# Patient Record
Sex: FEMALE | Race: WHITE | NOT HISPANIC OR LATINO
[De-identification: names, ages, dates, MRNs, and addresses within clinical notes are randomized per-mention and may not be internally consistent; named-entity substitution may affect disease eponyms.]

---

## 2018-07-31 ENCOUNTER — RESULT REVIEW (OUTPATIENT)
Age: 63
End: 2018-07-31

## 2019-11-20 ENCOUNTER — RESULT REVIEW (OUTPATIENT)
Age: 64
End: 2019-11-20

## 2020-08-12 ENCOUNTER — RESULT REVIEW (OUTPATIENT)
Age: 65
End: 2020-08-12

## 2021-11-17 ENCOUNTER — RESULT REVIEW (OUTPATIENT)
Age: 66
End: 2021-11-17

## 2021-11-18 ENCOUNTER — NON-APPOINTMENT (OUTPATIENT)
Age: 66
End: 2021-11-18

## 2021-11-19 PROBLEM — Z00.00 ENCOUNTER FOR PREVENTIVE HEALTH EXAMINATION: Status: ACTIVE | Noted: 2021-11-19

## 2021-12-01 ENCOUNTER — NON-APPOINTMENT (OUTPATIENT)
Age: 66
End: 2021-12-01

## 2021-12-01 ENCOUNTER — APPOINTMENT (OUTPATIENT)
Dept: BREAST CENTER | Facility: CLINIC | Age: 66
End: 2021-12-01
Payer: COMMERCIAL

## 2021-12-01 VITALS
BODY MASS INDEX: 25.49 KG/M2 | HEIGHT: 65 IN | OXYGEN SATURATION: 98 % | WEIGHT: 153 LBS | SYSTOLIC BLOOD PRESSURE: 141 MMHG | DIASTOLIC BLOOD PRESSURE: 78 MMHG | HEART RATE: 62 BPM

## 2021-12-01 DIAGNOSIS — Z87.898 PERSONAL HISTORY OF OTHER SPECIFIED CONDITIONS: ICD-10-CM

## 2021-12-01 DIAGNOSIS — N64.4 MASTODYNIA: ICD-10-CM

## 2021-12-01 DIAGNOSIS — Z86.79 PERSONAL HISTORY OF OTHER DISEASES OF THE CIRCULATORY SYSTEM: ICD-10-CM

## 2021-12-01 DIAGNOSIS — J98.4 OTHER DISORDERS OF LUNG: ICD-10-CM

## 2021-12-01 PROCEDURE — 99203 OFFICE O/P NEW LOW 30 MIN: CPT

## 2021-12-01 NOTE — HISTORY OF PRESENT ILLNESS
[FreeTextEntry1] : 66-year-old postmenopausal woman of Ashkenazi Roman Catholic descent with a family history of breast cancer at a screening mammogram which showed a 4 mm density in the left breast this was found on ultrasound to be a complex cyst and in addition adjacent to it at the 3 o'clock position was not another small mass possibly a cyst, BI-RADS 3.  Patient denies any breast masses or nipple discharge but she does have some right axillary pain occasionally.  Patient's paternal grandmother had breast cancer at age 98.  Patient has never had a breast biopsy and is never taken hormone replacement therapy except for estradiol cream which she since stopped.

## 2022-06-15 ENCOUNTER — APPOINTMENT (OUTPATIENT)
Dept: BREAST CENTER | Facility: CLINIC | Age: 67
End: 2022-06-15
Payer: COMMERCIAL

## 2022-06-15 DIAGNOSIS — R92.2 INCONCLUSIVE MAMMOGRAM: ICD-10-CM

## 2022-06-15 DIAGNOSIS — N60.12 DIFFUSE CYSTIC MASTOPATHY OF LEFT BREAST: ICD-10-CM

## 2022-06-15 PROCEDURE — 99212 OFFICE O/P EST SF 10 MIN: CPT

## 2022-06-15 NOTE — HISTORY OF PRESENT ILLNESS
[FreeTextEntry1] : Maternal grandmother with breast cancer at 98\par Fibrocystic changes\par \par Patient denies any breast masses and nipple discharge.  Recent left ultrasound showed benign stability (report pending)\par \par 66-year-old postmenopausal woman of Ashkenazi Oriental orthodox descent with a family history of breast cancer at a screening mammogram which showed a 4 mm density in the left breast this was found on ultrasound to be a complex cyst and in addition adjacent to it at the 3 o'clock position was not another small mass possibly a cyst, BI-RADS 3.  Patient denies any breast masses or nipple discharge but she does have some right axillary pain occasionally.  Patient's paternal grandmother had breast cancer at age 98.  Patient has never had a breast biopsy and is never taken hormone replacement therapy except for estradiol cream which she since stopped.

## 2023-07-27 ENCOUNTER — APPOINTMENT (OUTPATIENT)
Dept: BREAST CENTER | Facility: CLINIC | Age: 68
End: 2023-07-27
Payer: COMMERCIAL

## 2023-07-27 VITALS
BODY MASS INDEX: 23.99 KG/M2 | OXYGEN SATURATION: 97 % | WEIGHT: 144 LBS | HEART RATE: 54 BPM | DIASTOLIC BLOOD PRESSURE: 66 MMHG | HEIGHT: 65 IN | SYSTOLIC BLOOD PRESSURE: 101 MMHG

## 2023-07-27 DIAGNOSIS — Z12.31 ENCOUNTER FOR SCREENING MAMMOGRAM FOR MALIGNANT NEOPLASM OF BREAST: ICD-10-CM

## 2023-07-27 PROCEDURE — 99213 OFFICE O/P EST LOW 20 MIN: CPT

## 2023-07-27 NOTE — HISTORY OF PRESENT ILLNESS
[FreeTextEntry1] : Maternal grandmother with breast cancer at 98\par Fibrocystic changes\par \par Patient denies any breast masses and nipple discharge.  Last January she had a mammogram and ultrasound that was unremarkable with some focal left fat necrosis, benign.\par 66-year-old postmenopausal woman of Ashkenazi Rest[employer] descent with a family history of breast cancer at a screening mammogram which showed a 4 mm density in the left breast this was found on ultrasound to be a complex cyst and in addition adjacent to it at the 3 o'clock position was not another small mass possibly a cyst, BI-RADS 3.  Patient denies any breast masses or nipple discharge but she does have some right axillary pain occasionally.  Patient's paternal grandmother had breast cancer at age 98.  Patient has never had a breast biopsy and is never taken hormone replacement therapy except for estradiol cream which she since stopped.

## 2023-09-06 ENCOUNTER — APPOINTMENT (OUTPATIENT)
Dept: HEMATOLOGY ONCOLOGY | Facility: CLINIC | Age: 68
End: 2023-09-06

## 2023-09-07 NOTE — DISCUSSION/SUMMARY
[FreeTextEntry1] :   REASON FOR CONSULT  Linda Li is a 68-year-old female who was referred by Dr. Tapia for cancer genetic counseling and cascade genetic testing due to her family history of cancer and known familial SHERI gene mutation.    RELEVANT MEDICAL HISTORY    FAMILY HISTORY:  Ms. Li is a healthy individual who previously had a squamous cell cancer removed from her leg at age 60. Ms. Li also reports a family history of pancreatic, prostate, breast, stomach and other cancers, see below.  Of note, Ms. Shook's paternal first cousin was recently diagnosed with pancreatic cancer and pursued genetic testing with [facility] hereditary cancer panel reported on 3/30/2023. 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 A copy of his genetic report was available at the consultation.    OTHER MEDICAL AND SURGICAL HISTORY:  Asthma. Alpha-1 antitrypsin deficiency. Fibrocystic changes to left breast. History of spinal surgery. Hip replacement.    PAST OB/GYN HISTORY:  Obstetrical History: GP2  Age at Menarche: 13 Menopausal with LMP at age 42  Age at First Live Birth: 31  Oral Contraceptive Use: Yes, (for 3 years)  Hormone Replacement Therapy: Yes, (currently on Estradiol cream for past 5 years)    CANCER SCREENING HISTORY:    Breast: Mammogram and sonograms every 6 months to monitor left breast cysts which have been present for the past 3 years. Bilateral mammogram in 1/18/2023 in [place], reported "left breast finding consistent with benign fat necrosis, no evidence of malignancy". Annual follow up recommended. Clinical breast exam last month with Dr. Tapia. GYN: Last exam within past couple of years. Colon:  Baseline colonoscopy at age 60 reported normal. Last colonoscopy August 30, 2023, single tubular adenoma removed from sigmoid colon.  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 FBSE with Dermatologist every 4 months. Living in [place] for past 5 years.    SOCIAL HISTORY:  -	 -	Tobacco-product use: No  FAMILY HISTORY:  Maternal ancestry was reported as [place] and paternal ancestry was reported as Polish with Ashkenazi Methodist ancestry on both sides. A detailed family history of cancer was ascertained. Relevant diagnoses are detailed below and in the scanned pedigree.        RISK ASSESSMENT:  Ms. Li is at risk of inheriting the familial SHERI gene mutation identified in her paternal first cousin. Individuals with a single pathogenic SHERI mutation have increased risks for breast, ovarian, pancreatic and possibly other cancers. Ms. Li was very keen to undergo genetic testing to help determine any relevant cancer screening or risk reduction options. Given the patient's Ashkenazi Methodist heritage and family history of breast, prostate and pancreatic cancers, Ms. Li is a good candidate for comprehensive genetic testing of additional cancer susceptibility genes. We recommended genetic testing with breast, prostate, and pancreatic cancer panels, which include analysis of the SHERI gene. This test analyzes 26 genes: APC, SHERI, BARD1, BMPR1A, BRCA1, BRCA2, CDH1, CDKN2A, CHEK2, EPCAM, HOXB13, MEN1, MLH1, MSH2, MSH6, NBN, NF1, PALB2, PMS2, PTEN, SMAD4, STK11, TP53, TSC1, TSC2, VHL.   We discussed the risks, benefits and limitations, and implications of genetic testing. We also discussed the psychosocial implications of genetic testing. Possible test results were reviewed with Ms. Li, along with associated medical management options. The Genetic Information Non-discrimination Act ([place]) was also reviewed.     Ms. Li consented to the above-mentioned genetic testing panels. Blood was drawn in our laboratory and sent to [facility] today.      PLAN:    1.	Blood drawn today will be sent to [facility] for analysis.   2.	We will contact Ms. Li once the results are available and will schedule a follow-up appointment, as needed. Results generally return in 2-3 weeks from the day the sample is received in the lab.  3.	Family member's report(s) will be scanned to [facility] secure file cabinet.      For any additional questions please call Cancer Genetics at [phone].       Sadia Jerome, MSc, [facility]  Genetic Counselor, Cancer Genetics      CC:   Dr. Tapia

## 2023-09-19 ENCOUNTER — NON-APPOINTMENT (OUTPATIENT)
Age: 68
End: 2023-09-19

## 2025-07-15 ENCOUNTER — APPOINTMENT (OUTPATIENT)
Dept: BREAST CENTER | Facility: CLINIC | Age: 70
End: 2025-07-15
Payer: MEDICARE

## 2025-07-15 VITALS
OXYGEN SATURATION: 99 % | SYSTOLIC BLOOD PRESSURE: 114 MMHG | WEIGHT: 148 LBS | HEIGHT: 65 IN | DIASTOLIC BLOOD PRESSURE: 62 MMHG | HEART RATE: 75 BPM | BODY MASS INDEX: 24.66 KG/M2

## 2025-07-15 PROBLEM — R92.30 DENSE BREAST TISSUE ON MAMMOGRAM: Status: ACTIVE | Noted: 2022-06-15

## 2025-07-15 PROCEDURE — 99203 OFFICE O/P NEW LOW 30 MIN: CPT

## 2025-08-25 ENCOUNTER — APPOINTMENT (OUTPATIENT)
Dept: FAMILY MEDICINE | Facility: CLINIC | Age: 70
End: 2025-08-25
Payer: MEDICARE

## 2025-08-25 DIAGNOSIS — L08.9 LOCAL INFECTION OF THE SKIN AND SUBCUTANEOUS TISSUE, UNSPECIFIED: ICD-10-CM

## 2025-08-25 PROCEDURE — 99203 OFFICE O/P NEW LOW 30 MIN: CPT | Mod: 2W

## 2025-08-25 RX ORDER — MUPIROCIN 20 MG/G
2 OINTMENT TOPICAL 3 TIMES DAILY
Qty: 1 | Refills: 0 | Status: ACTIVE | COMMUNITY
Start: 2025-08-25 | End: 1900-01-01

## 2025-08-25 RX ORDER — CEPHALEXIN 500 MG/1
500 CAPSULE ORAL
Qty: 10 | Refills: 0 | Status: ACTIVE | COMMUNITY
Start: 2025-08-25 | End: 1900-01-01